# Patient Record
Sex: FEMALE | URBAN - METROPOLITAN AREA
[De-identification: names, ages, dates, MRNs, and addresses within clinical notes are randomized per-mention and may not be internally consistent; named-entity substitution may affect disease eponyms.]

---

## 2020-02-10 ENCOUNTER — OFFICE VISIT (OUTPATIENT)
Dept: URBAN - METROPOLITAN AREA CLINIC 52 | Facility: CLINIC | Age: 75
End: 2020-02-10
Payer: MEDICARE

## 2020-02-10 DIAGNOSIS — H35.3131 NONEXUDATIVE AGE-RELATED MACULAR DEGENERATION, BILATERAL, EARLY DRY STAGE: Primary | ICD-10-CM

## 2020-02-10 DIAGNOSIS — H40.053 OCULAR HYPERTENSION, BILATERAL: ICD-10-CM

## 2020-02-10 DIAGNOSIS — H25.13 AGE-RELATED NUCLEAR CATARACT, BILATERAL: ICD-10-CM

## 2020-02-10 DIAGNOSIS — H52.4 PRESBYOPIA: ICD-10-CM

## 2020-02-10 PROCEDURE — 92014 COMPRE OPH EXAM EST PT 1/>: CPT | Performed by: OPHTHALMOLOGY

## 2020-02-10 ASSESSMENT — INTRAOCULAR PRESSURE
OS: 19
OD: 21
OS: 22
OD: 22

## 2020-02-10 ASSESSMENT — VISUAL ACUITY
OD: 20/25
OS: 20/25

## 2020-02-10 NOTE — IMPRESSION/PLAN
Impression: Ocular hypertension, bilateral: H40.053.  Plan: thick cornea's, nerves are healthy, will monitor

## 2020-02-10 NOTE — IMPRESSION/PLAN
Impression: Age-related nuclear cataract, bilateral: H25.13. Plan: Discussed diagnosis in detail with patient. No treatment is required at this time. Reassured patient of current condition and treatment. Will continue to observe condition and or symptoms.

## 2021-02-02 ENCOUNTER — OFFICE VISIT (OUTPATIENT)
Dept: URBAN - METROPOLITAN AREA CLINIC 52 | Facility: CLINIC | Age: 76
End: 2021-02-02
Payer: MEDICARE

## 2021-02-02 PROCEDURE — 92014 COMPRE OPH EXAM EST PT 1/>: CPT | Performed by: OPHTHALMOLOGY

## 2021-02-02 ASSESSMENT — INTRAOCULAR PRESSURE
OD: 19
OD: 20
OS: 21
OS: 19

## 2021-02-02 NOTE — IMPRESSION/PLAN
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 Plan: S/p LASIK. Educated patient on affects LASIK can have on calculations of IOL, patient voiced understanding. Stable in appearance. Monitor yearly.

## 2022-02-01 ENCOUNTER — OFFICE VISIT (OUTPATIENT)
Dept: URBAN - METROPOLITAN AREA CLINIC 52 | Facility: CLINIC | Age: 77
End: 2022-02-01
Payer: MEDICARE

## 2022-02-01 DIAGNOSIS — H43.813 VITREOUS DEGENERATION, BILATERAL: ICD-10-CM

## 2022-02-01 DIAGNOSIS — H17.89 OTHER CORNEAL SCARS AND OPACITIES: ICD-10-CM

## 2022-02-01 PROCEDURE — 92133 CPTRZD OPH DX IMG PST SGM ON: CPT | Performed by: OPHTHALMOLOGY

## 2022-02-01 PROCEDURE — 99214 OFFICE O/P EST MOD 30 MIN: CPT | Performed by: OPHTHALMOLOGY

## 2022-02-01 PROCEDURE — 92134 CPTRZ OPH DX IMG PST SGM RTA: CPT | Performed by: OPHTHALMOLOGY

## 2022-02-01 ASSESSMENT — INTRAOCULAR PRESSURE
OD: 21
OS: 22

## 2022-02-01 NOTE — IMPRESSION/PLAN
Impression: Nonexudative age-related macular degeneration, bilateral, early dry stage: H35.3131. Plan: Educated patient on diagnosis, in detail, explained there is no treatment for dry macular degeneration, there is a chance of progression (with progression can come VA restrictions), will continue to monitor for changes. Patient to RTC if any sudden/new VA loss/complication. MAC OCT ordered and obtained today and reviewed with patient. No intervention recommended. Monitor yearly. Patient to call us sooner, onset of new visual symptoms. Patient understands and agrees with plan.

## 2022-02-01 NOTE — IMPRESSION/PLAN
Impression: Ocular hypertension, bilateral: H40.053. CCT: 582/583 Plan: IOP slightly elevated today with thicker pachy. RNFL ordered and reviewed with patient. Given findings, will have patient come back for VF 24-2 for continued close monitoring. Advised patient that pending VF results, we may initiate drop therapy. Patient understands and agrees with plan.

## 2022-03-30 ENCOUNTER — TESTING ONLY (OUTPATIENT)
Dept: URBAN - METROPOLITAN AREA CLINIC 52 | Facility: CLINIC | Age: 77
End: 2022-03-30
Payer: MEDICARE

## 2022-03-30 PROCEDURE — 92083 EXTENDED VISUAL FIELD XM: CPT | Performed by: OPHTHALMOLOGY

## 2022-06-20 ENCOUNTER — OFFICE VISIT (OUTPATIENT)
Dept: URBAN - METROPOLITAN AREA CLINIC 52 | Facility: CLINIC | Age: 77
End: 2022-06-20
Payer: MEDICARE

## 2022-06-20 DIAGNOSIS — H40.053 OCULAR HYPERTENSION, BILATERAL: Primary | ICD-10-CM

## 2022-06-20 PROCEDURE — 76514 ECHO EXAM OF EYE THICKNESS: CPT | Performed by: OPHTHALMOLOGY

## 2022-06-20 PROCEDURE — 99213 OFFICE O/P EST LOW 20 MIN: CPT | Performed by: OPHTHALMOLOGY

## 2022-06-20 ASSESSMENT — INTRAOCULAR PRESSURE
OD: 21
OD: 17
OS: 17
OS: 22

## 2022-06-20 NOTE — IMPRESSION/PLAN
Impression: Ocular hypertension, bilateral: H40.053. CCT: 582/583 Plan: Discussed diagnosis with patient. IOP slightly elevated. Healthy ON. Will continue to monitor off of IOP gtts.  ! year DE/RNFL OCT

## 2023-06-22 ENCOUNTER — OFFICE VISIT (OUTPATIENT)
Dept: URBAN - METROPOLITAN AREA CLINIC 52 | Facility: CLINIC | Age: 78
End: 2023-06-22
Payer: MEDICARE

## 2023-06-22 DIAGNOSIS — H40.053 OCULAR HYPERTENSION, BILATERAL: Primary | ICD-10-CM

## 2023-06-22 DIAGNOSIS — H25.13 AGE-RELATED NUCLEAR CATARACT, BILATERAL: ICD-10-CM

## 2023-06-22 DIAGNOSIS — H35.3131 NONEXUDATIVE AGE-RELATED MACULAR DEGENERATION, BILATERAL, EARLY DRY STAGE: ICD-10-CM

## 2023-06-22 PROCEDURE — 92133 CPTRZD OPH DX IMG PST SGM ON: CPT | Performed by: OPHTHALMOLOGY

## 2023-06-22 PROCEDURE — 99214 OFFICE O/P EST MOD 30 MIN: CPT | Performed by: OPHTHALMOLOGY

## 2023-06-22 ASSESSMENT — INTRAOCULAR PRESSURE
OS: 23
OD: 23

## 2023-06-22 NOTE — IMPRESSION/PLAN
Impression: Ocular hypertension, bilateral: H40.053. CCT: 582/583 Plan: IOP slightly elevated today with thicker pach. RNFL ordered and reviewed with patient. Given findings, will have patient come back for VF 24-2 for continued close monitoring. Advised patient that pending VF results, we may initiate drop therapy. Patient understands and agrees with plan.

## 2024-07-01 ENCOUNTER — OFFICE VISIT (OUTPATIENT)
Dept: URBAN - METROPOLITAN AREA CLINIC 52 | Facility: CLINIC | Age: 79
End: 2024-07-01
Payer: MEDICARE

## 2024-07-01 DIAGNOSIS — H25.13 AGE-RELATED NUCLEAR CATARACT, BILATERAL: ICD-10-CM

## 2024-07-01 DIAGNOSIS — H40.013 OPEN ANGLE WITH BORDERLINE FINDINGS, LOW RISK, BILATERAL: Primary | ICD-10-CM

## 2024-07-01 DIAGNOSIS — H35.3131 NONEXUDATIVE AGE-RELATED MACULAR DEGENERATION, BILATERAL, EARLY DRY STAGE: ICD-10-CM

## 2024-07-01 DIAGNOSIS — H40.053 OCULAR HYPERTENSION, BILATERAL: ICD-10-CM

## 2024-07-01 PROCEDURE — 92134 CPTRZ OPH DX IMG PST SGM RTA: CPT | Performed by: OPHTHALMOLOGY

## 2024-07-01 PROCEDURE — 99214 OFFICE O/P EST MOD 30 MIN: CPT | Performed by: OPHTHALMOLOGY

## 2024-07-01 PROCEDURE — 92133 CPTRZD OPH DX IMG PST SGM ON: CPT | Performed by: OPHTHALMOLOGY

## 2024-07-01 RX ORDER — TIMOLOL MALEATE 5 MG/ML
0.5 % SOLUTION/ DROPS OPHTHALMIC
Qty: 10 | Refills: 3 | Status: ACTIVE
Start: 2024-07-01

## 2024-07-01 ASSESSMENT — VISUAL ACUITY
OS: 20/30
OD: 20/25

## 2024-07-01 ASSESSMENT — INTRAOCULAR PRESSURE
OS: 20
OS: 21
OD: 21

## 2024-10-11 ENCOUNTER — OFFICE VISIT (OUTPATIENT)
Dept: URBAN - METROPOLITAN AREA CLINIC 52 | Facility: CLINIC | Age: 79
End: 2024-10-11
Payer: MEDICARE

## 2024-10-11 DIAGNOSIS — H01.00B UNSPECIFIED BLEPHARITIS LEFT EYE, UPPER AND LOWER EYELIDS: ICD-10-CM

## 2024-10-11 DIAGNOSIS — B88.0 OTHER ACARIASIS: ICD-10-CM

## 2024-10-11 DIAGNOSIS — H01.00A UNSPECIFIED BLEPHARITIS RIGHT EYE, UPPER AND LOWER EYELIDS: ICD-10-CM

## 2024-10-11 DIAGNOSIS — H16.143 PUNCTATE KERATITIS, BILATERAL: ICD-10-CM

## 2024-10-11 DIAGNOSIS — H40.013 OPEN ANGLE WITH BORDERLINE FINDINGS, LOW RISK, BILATERAL: Primary | ICD-10-CM

## 2024-10-11 PROCEDURE — 99204 OFFICE O/P NEW MOD 45 MIN: CPT | Performed by: OPTOMETRIST

## 2024-10-11 PROCEDURE — 92083 EXTENDED VISUAL FIELD XM: CPT | Performed by: OPTOMETRIST

## 2024-10-11 ASSESSMENT — INTRAOCULAR PRESSURE
OD: 17
OS: 18

## 2025-02-11 ENCOUNTER — OFFICE VISIT (OUTPATIENT)
Dept: URBAN - METROPOLITAN AREA CLINIC 52 | Facility: CLINIC | Age: 80
End: 2025-02-11
Payer: MEDICARE

## 2025-02-11 DIAGNOSIS — B88.0 OTHER ACARIASIS: ICD-10-CM

## 2025-02-11 DIAGNOSIS — H40.1111 PRIMARY OPEN-ANGLE GLAUCOMA, MILD STAGE, RIGHT EYE: ICD-10-CM

## 2025-02-11 DIAGNOSIS — H40.1122 PRIMARY OPEN-ANGLE GLAUCOMA, MODERATE STAGE, LEFT EYE: Primary | ICD-10-CM

## 2025-02-11 PROCEDURE — 99214 OFFICE O/P EST MOD 30 MIN: CPT | Performed by: OPTOMETRIST

## 2025-02-11 PROCEDURE — 92083 EXTENDED VISUAL FIELD XM: CPT | Performed by: OPTOMETRIST

## 2025-02-11 ASSESSMENT — INTRAOCULAR PRESSURE
OD: 20
OS: 20

## 2025-06-11 ENCOUNTER — OFFICE VISIT (OUTPATIENT)
Dept: URBAN - METROPOLITAN AREA CLINIC 52 | Facility: CLINIC | Age: 80
End: 2025-06-11
Payer: MEDICARE

## 2025-06-11 DIAGNOSIS — H40.1111 PRIMARY OPEN-ANGLE GLAUCOMA, MILD STAGE, RIGHT EYE: ICD-10-CM

## 2025-06-11 DIAGNOSIS — H40.1122 PRIMARY OPEN-ANGLE GLAUCOMA, MODERATE STAGE, LEFT EYE: Primary | ICD-10-CM

## 2025-06-11 PROCEDURE — 99213 OFFICE O/P EST LOW 20 MIN: CPT | Performed by: OPTOMETRIST

## 2025-06-11 ASSESSMENT — INTRAOCULAR PRESSURE
OS: 19
OD: 19